# Patient Record
Sex: FEMALE
[De-identification: names, ages, dates, MRNs, and addresses within clinical notes are randomized per-mention and may not be internally consistent; named-entity substitution may affect disease eponyms.]

---

## 2022-06-08 PROBLEM — Z00.00 ENCOUNTER FOR PREVENTIVE HEALTH EXAMINATION: Status: ACTIVE | Noted: 2022-06-08

## 2022-06-15 ENCOUNTER — APPOINTMENT (OUTPATIENT)
Dept: GASTROENTEROLOGY | Facility: CLINIC | Age: 87
End: 2022-06-15
Payer: MEDICARE

## 2022-06-15 VITALS
DIASTOLIC BLOOD PRESSURE: 90 MMHG | OXYGEN SATURATION: 98 % | HEIGHT: 61 IN | WEIGHT: 135 LBS | TEMPERATURE: 98.4 F | HEART RATE: 78 BPM | BODY MASS INDEX: 25.49 KG/M2 | SYSTOLIC BLOOD PRESSURE: 140 MMHG

## 2022-06-15 PROCEDURE — 99204 OFFICE O/P NEW MOD 45 MIN: CPT

## 2022-06-15 NOTE — REASON FOR VISIT
[Consultation] : a consultation visit [FreeTextEntry1] : Kindly asked by Dr. Blanco to consult and evaluate patient for                    \par A copy of this note is being sent to physician requesting consultation.

## 2022-06-15 NOTE — HISTORY OF PRESENT ILLNESS
[FreeTextEntry1] : 89f HTN,GERD, choly, remarkably well-appearing, but [place], had evaluation by PMD 2-3mo ago for vague lower abd discomfort.  This prompted US - 1.6cm R lobe of liver mass.  CBC, CMet 4/12/22 w/ normal cbc, lfts., renal fnx.\par The abdominal symptoms she was having have all resolved.  \par \par She has longstanding heartburn - worse w/ fatty meals.  No dysphagia. Improves w/ PPI - had EGD>15y ago reportedly normal\par \par Had Colonoscopy >15y ago reportedly normal\par \par No brbpr, melena, wt loss, fever, n/v.\par \par \par Soc:  no tobacco or significant EtOH\par FHx: no FHx GI malignancy or IBD\par \par ROS:\par Constitutional:: no weight loss, fevers\par ENT: no deafness\par Eyes: not blind\par Neck: no LN\par Chest: no dyspnea/cough\par Cardiac: no chest pain\par Vascular: no leg swelling\par GI: no abdominal pain, nausea, vomiting, diarrhea, constipation, rectal bleeding, dysphagia, melena unless otherwise noted in HPI\par : no dysuria, dark urine\par Skin: no rashes, jaundice\par Heme: no bleeding\par Endocrine: no DM unless otherwise stated in HPI\par \par Px: (VS noted below)\par General: NAD\par Eyes: anicteric\par Oropharynx:  clear\par Neck: no LN\par Chest: normal respiratory effort\par CVS: regular\par Abd: soft, NT, ND, +BS, no HSM\par Ext: no atrophy\par Neuro: grossly nonfocal\par \par Labs/imaging/prior endoscopic results reviewed to the extent available and noted in HPI\par \par

## 2022-06-15 NOTE — CONSULT LETTER
[FreeTextEntry1] : Dear Dr. KYRA LEAHY ,\par \par I had the pleasure of evaluating your patient,  ARMAND PRYOR.\par \par Please refer to my note below.\par \par Thank you very much for allowing me to participate in the care of this patient.  If you have any questions, please do not hesitate to contact me.\par \par Sincerely, \par \par Sina Kinney MD\par

## 2022-06-15 NOTE — ASSESSMENT
[FreeTextEntry1] : -liver lesion - reviewed imaging options- MRI to characterize further (she is anxious RE: f/u) vs CT vs f/u US in 3mo.  She actually had an US at [facility] 2-3y ago and if this showed stability, then this might be sufficient.  Will request. If not available or new finding, will plan open MRI per pt preferences.  She should f/u if her GI sx recur.\par \par - GERD - dietary and lifestyle modifications, including weight loss, smaller/frequent/earlier (>3h prior to lying down) meals, trials of cutting down/out caffeine, chocolate, tomatoes, fatty foods, alcohol.\par \par - Colon cancer screening - no further screening required\par \par PMD/consultation/hospital notes and Labs/imaging/prior endoscopic results reviewed to extent noted in HPI; and, if procedure code billed on this visit for lab draw, this serves to signify that labs were drawn here in this office.\par

## 2022-10-03 ENCOUNTER — APPOINTMENT (OUTPATIENT)
Dept: GASTROENTEROLOGY | Facility: CLINIC | Age: 87
End: 2022-10-03

## 2022-10-03 VITALS
BODY MASS INDEX: 26.11 KG/M2 | HEIGHT: 60 IN | SYSTOLIC BLOOD PRESSURE: 159 MMHG | DIASTOLIC BLOOD PRESSURE: 70 MMHG | WEIGHT: 133 LBS | HEART RATE: 75 BPM | OXYGEN SATURATION: 98 %

## 2022-10-03 DIAGNOSIS — K21.9 GASTRO-ESOPHAGEAL REFLUX DISEASE W/OUT ESOPHAGITIS: ICD-10-CM

## 2022-10-03 DIAGNOSIS — R16.0 HEPATOMEGALY, NOT ELSEWHERE CLASSIFIED: ICD-10-CM

## 2022-10-03 DIAGNOSIS — Z12.11 ENCOUNTER FOR SCREENING FOR MALIGNANT NEOPLASM OF COLON: ICD-10-CM

## 2022-10-03 PROCEDURE — 99214 OFFICE O/P EST MOD 30 MIN: CPT

## 2022-10-03 NOTE — HISTORY OF PRESENT ILLNESS
[FreeTextEntry1] : 89f HTN,GERD, choly, remarkably well-appearing, but [place], had evaluation by PMD earlier in year for self-lmited vague lower abd discomfort.  This prompted US - 1.6cm R lobe of liver mass.  CBC, CMet 4/12/22 w/ normal cbc, lfts., renal fnx. The abdominal symptoms she was having have all resolved.  \par \par She had repeat labs 8/2022 with normal cbc, cmet.  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  Choly, renal cyts\par \par Had EGD>15y ago reportedly normal\par \par Had Colonoscopy >15y ago reportedly normal\par \par No brbpr, melena, wt loss, fever, n/v.\par \par \par Soc:  no tobacco or significant EtOH\par FHx: no FHx GI malignancy or IBD\par \par ROS:\par Constitutional:: no weight loss, fevers\par ENT: no deafness\par Eyes: not blind\par Neck: no LN\par Chest: no dyspnea/cough\par Cardiac: no chest pain\par Vascular: no leg swelling\par GI: no abdominal pain, nausea, vomiting, diarrhea, constipation, rectal bleeding, dysphagia, melena unless otherwise noted in HPI\par : no dysuria, dark urine\par Skin: no rashes, jaundice\par Heme: no bleeding\par Endocrine: no DM unless otherwise stated in HPI\par \par Px: (VS noted below)\par General: NAD\par Eyes: anicteric\par Oropharynx:  clear\par Neck: no LN\par Chest: normal respiratory effort\par CVS: regular\par Abd: soft, NT, ND, +BS, no HSM\par Ext: no atrophy\par Neuro: grossly nonfocal\par \par Labs/imaging/prior endoscopic results reviewed to the extent available and noted in HPI\par \par

## 2023-02-20 ENCOUNTER — OFFICE (OUTPATIENT)
Dept: URBAN - METROPOLITAN AREA CLINIC 30 | Facility: CLINIC | Age: 88
Setting detail: OPHTHALMOLOGY
End: 2023-02-20
Payer: MEDICARE

## 2023-02-20 DIAGNOSIS — H10.13: ICD-10-CM

## 2023-02-20 DIAGNOSIS — H47.233: ICD-10-CM

## 2023-02-20 DIAGNOSIS — D35.2: ICD-10-CM

## 2023-02-20 DIAGNOSIS — H02.403: ICD-10-CM

## 2023-02-20 DIAGNOSIS — H16.223: ICD-10-CM

## 2023-02-20 DIAGNOSIS — H35.439: ICD-10-CM

## 2023-02-20 DIAGNOSIS — Z96.1: ICD-10-CM

## 2023-02-20 DIAGNOSIS — H52.4: ICD-10-CM

## 2023-02-20 DIAGNOSIS — H35.373: ICD-10-CM

## 2023-02-20 DIAGNOSIS — H40.031: ICD-10-CM

## 2023-02-20 PROCEDURE — 92014 COMPRE OPH EXAM EST PT 1/>: CPT | Performed by: OPHTHALMOLOGY

## 2023-02-20 PROCEDURE — 92015 DETERMINE REFRACTIVE STATE: CPT | Performed by: OPHTHALMOLOGY

## 2023-02-20 PROCEDURE — 92083 EXTENDED VISUAL FIELD XM: CPT | Performed by: OPHTHALMOLOGY

## 2023-02-20 PROCEDURE — 92133 CPTRZD OPH DX IMG PST SGM ON: CPT | Performed by: OPHTHALMOLOGY

## 2023-02-20 ASSESSMENT — REFRACTION_CURRENTRX
OD_ADD: +2.75
OS_VPRISM_DIRECTION: BF
OD_AXIS: 005
OS_SPHERE: -0.75
OS_OVR_VA: 20/
OS_ADD: +2.75
OD_CYLINDER: +0.75
OS_CYLINDER: +0.75
OS_AXIS: 010
OD_VPRISM_DIRECTION: BF
OD_SPHERE: -0.50
OD_OVR_VA: 20/

## 2023-02-20 ASSESSMENT — SPHEQUIV_DERIVED
OD_SPHEQUIV: -0.25
OS_SPHEQUIV: -0.25
OD_SPHEQUIV: -0.25
OS_SPHEQUIV: -0.25

## 2023-02-20 ASSESSMENT — REFRACTION_MANIFEST
OS_VA1: 20/30+1
OD_ADD: +2.50
OS_SPHERE: -0.75
OS_CYLINDER: +1.00
OD_AXIS: 155
OD_CYLINDER: +1.50
OS_ADD: +2.50
OD_SPHERE: -1.00
OD_VA1: 20/30-2
OU_VA: 20/30
OS_AXIS: 175

## 2023-02-20 ASSESSMENT — REFRACTION_AUTOREFRACTION
OS_CYLINDER: +1.00
OD_SPHERE: -1.00
OS_AXIS: 175
OD_AXIS: 155
OD_CYLINDER: +1.50
OS_SPHERE: -0.75

## 2023-02-20 ASSESSMENT — VISUAL ACUITY
OS_BCVA: 20/50
OD_BCVA: 20/40-1

## 2023-02-20 ASSESSMENT — LID POSITION - COMMENTS
OS_COMMENTS: PTOSIS, MILD, NOT AFFECTING THE VISION
OD_COMMENTS: PTOSIS, MILD, NOT AFFECTING THE VISION

## 2023-02-20 ASSESSMENT — CONFRONTATIONAL VISUAL FIELD TEST (CVF)
OD_FINDINGS: FULL
OS_FINDINGS: FULL

## 2023-02-20 ASSESSMENT — TONOMETRY
OS_IOP_MMHG: 19
OD_IOP_MMHG: 17

## 2023-08-21 ENCOUNTER — OFFICE (OUTPATIENT)
Dept: URBAN - METROPOLITAN AREA CLINIC 30 | Facility: CLINIC | Age: 88
Setting detail: OPHTHALMOLOGY
End: 2023-08-21
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
## 2023-08-21 PROCEDURE — NO SHOW FE NO SHOW FEE: Performed by: OPHTHALMOLOGY

## 2023-08-22 ENCOUNTER — RX ONLY (RX ONLY)
Age: 88
End: 2023-08-22

## 2023-08-22 ENCOUNTER — OFFICE (OUTPATIENT)
Dept: URBAN - METROPOLITAN AREA CLINIC 30 | Facility: CLINIC | Age: 88
Setting detail: OPHTHALMOLOGY
End: 2023-08-22
Payer: MEDICARE

## 2023-08-22 DIAGNOSIS — H35.439: ICD-10-CM

## 2023-08-22 DIAGNOSIS — H10.13: ICD-10-CM

## 2023-08-22 DIAGNOSIS — H16.223: ICD-10-CM

## 2023-08-22 DIAGNOSIS — D35.2: ICD-10-CM

## 2023-08-22 DIAGNOSIS — H35.373: ICD-10-CM

## 2023-08-22 DIAGNOSIS — H47.233: ICD-10-CM

## 2023-08-22 PROCEDURE — 92250 FUNDUS PHOTOGRAPHY W/I&R: CPT | Performed by: OPHTHALMOLOGY

## 2023-08-22 PROCEDURE — 68761 CLOSE TEAR DUCT OPENING: CPT | Performed by: OPHTHALMOLOGY

## 2023-08-22 PROCEDURE — 92014 COMPRE OPH EXAM EST PT 1/>: CPT | Performed by: OPHTHALMOLOGY

## 2023-08-22 ASSESSMENT — REFRACTION_AUTOREFRACTION
OS_SPHERE: -0.75
OD_CYLINDER: +1.50
OD_AXIS: 155
OS_CYLINDER: +1.00
OS_AXIS: 175
OD_SPHERE: -1.00

## 2023-08-22 ASSESSMENT — SPHEQUIV_DERIVED
OD_SPHEQUIV: -0.25
OS_SPHEQUIV: -0.25
OS_SPHEQUIV: -0.25
OD_SPHEQUIV: -0.25

## 2023-08-22 ASSESSMENT — REFRACTION_MANIFEST
OS_VA1: 20/30+1
OD_VA1: 20/30-2
OD_ADD: +2.50
OS_CYLINDER: +1.00
OD_CYLINDER: +1.50
OD_AXIS: 155
OU_VA: 20/30
OS_AXIS: 175
OS_ADD: +2.50
OD_SPHERE: -1.00
OS_SPHERE: -0.75

## 2023-08-22 ASSESSMENT — VISUAL ACUITY
OD_BCVA: 20/40-2
OS_BCVA: 20/40-1

## 2023-08-22 ASSESSMENT — LID POSITION - COMMENTS
OD_COMMENTS: PTOSIS, MILD, NOT AFFECTING THE VISION
OS_COMMENTS: PTOSIS, MILD, NOT AFFECTING THE VISION

## 2023-08-22 ASSESSMENT — REFRACTION_CURRENTRX
OS_ADD: +2.75
OD_SPHERE: -0.50
OS_AXIS: 010
OS_CYLINDER: +0.75
OD_CYLINDER: +0.75
OS_SPHERE: -0.75
OD_OVR_VA: 20/
OS_VPRISM_DIRECTION: BF
OD_AXIS: 005
OS_OVR_VA: 20/
OD_ADD: +2.75
OD_VPRISM_DIRECTION: BF

## 2023-08-22 ASSESSMENT — CONFRONTATIONAL VISUAL FIELD TEST (CVF)
OD_FINDINGS: FULL
OS_FINDINGS: FULL

## 2023-08-22 ASSESSMENT — TONOMETRY
OS_IOP_MMHG: 18
OD_IOP_MMHG: 16

## 2025-08-28 ENCOUNTER — APPOINTMENT (OUTPATIENT)
Dept: GASTROENTEROLOGY | Facility: CLINIC | Age: 89
End: 2025-08-28
Payer: MEDICARE

## 2025-08-28 VITALS
WEIGHT: 113 LBS | DIASTOLIC BLOOD PRESSURE: 68 MMHG | HEART RATE: 73 BPM | OXYGEN SATURATION: 96 % | SYSTOLIC BLOOD PRESSURE: 140 MMHG | HEIGHT: 60 IN | BODY MASS INDEX: 22.19 KG/M2

## 2025-08-28 DIAGNOSIS — R19.7 DIARRHEA, UNSPECIFIED: ICD-10-CM

## 2025-08-28 DIAGNOSIS — Z12.11 ENCOUNTER FOR SCREENING FOR MALIGNANT NEOPLASM OF COLON: ICD-10-CM

## 2025-08-28 DIAGNOSIS — K86.2 CYST OF PANCREAS: ICD-10-CM

## 2025-08-28 PROCEDURE — G2211 COMPLEX E/M VISIT ADD ON: CPT

## 2025-08-28 PROCEDURE — 99214 OFFICE O/P EST MOD 30 MIN: CPT

## 2025-08-28 RX ORDER — AMLODIPINE BESYLATE 5 MG/1
5 TABLET ORAL
Refills: 0 | Status: ACTIVE | COMMUNITY

## 2025-08-28 RX ORDER — CHOLESTYRAMINE 4 G/9G
4 POWDER, FOR SUSPENSION ORAL
Refills: 0 | Status: ACTIVE | COMMUNITY

## 2025-08-28 RX ORDER — LORAZEPAM 1 MG/1
1 TABLET ORAL
Refills: 0 | Status: ACTIVE | COMMUNITY

## 2025-08-28 RX ORDER — HYDROCHLOROTHIAZIDE 25 MG/1
25 TABLET ORAL
Refills: 0 | Status: ACTIVE | COMMUNITY

## 2025-08-28 RX ORDER — POTASSIUM CHLORIDE 1.5 G/1.58G
20 POWDER, FOR SOLUTION ORAL
Refills: 0 | Status: ACTIVE | COMMUNITY

## 2025-08-28 RX ORDER — SERTRALINE 25 MG/1
25 TABLET, FILM COATED ORAL
Refills: 0 | Status: ACTIVE | COMMUNITY

## 2025-08-28 RX ORDER — PANTOPRAZOLE SODIUM 40 MG/10ML
40 INJECTION, POWDER, FOR SOLUTION INTRAVENOUS
Refills: 0 | Status: ACTIVE | COMMUNITY

## 2025-08-29 PROBLEM — K86.2 PANCREAS CYST: Status: ACTIVE | Noted: 2025-08-29
